# Patient Record
Sex: MALE
[De-identification: names, ages, dates, MRNs, and addresses within clinical notes are randomized per-mention and may not be internally consistent; named-entity substitution may affect disease eponyms.]

---

## 2019-10-29 ENCOUNTER — HOSPITAL ENCOUNTER (EMERGENCY)
Dept: HOSPITAL 5 - ED | Age: 49
LOS: 1 days | Discharge: HOME | End: 2019-10-30
Payer: COMMERCIAL

## 2019-10-29 DIAGNOSIS — R11.2: ICD-10-CM

## 2019-10-29 DIAGNOSIS — Z79.899: ICD-10-CM

## 2019-10-29 DIAGNOSIS — R00.1: ICD-10-CM

## 2019-10-29 DIAGNOSIS — N39.0: Primary | ICD-10-CM

## 2019-10-29 DIAGNOSIS — N20.0: ICD-10-CM

## 2019-10-29 PROCEDURE — 93010 ELECTROCARDIOGRAM REPORT: CPT

## 2019-10-29 PROCEDURE — 93005 ELECTROCARDIOGRAM TRACING: CPT

## 2019-10-29 PROCEDURE — 96375 TX/PRO/DX INJ NEW DRUG ADDON: CPT

## 2019-10-29 PROCEDURE — 36415 COLL VENOUS BLD VENIPUNCTURE: CPT

## 2019-10-29 PROCEDURE — 83690 ASSAY OF LIPASE: CPT

## 2019-10-29 PROCEDURE — 80307 DRUG TEST PRSMV CHEM ANLYZR: CPT

## 2019-10-29 PROCEDURE — 74176 CT ABD & PELVIS W/O CONTRAST: CPT

## 2019-10-29 PROCEDURE — 96365 THER/PROPH/DIAG IV INF INIT: CPT

## 2019-10-29 PROCEDURE — 81001 URINALYSIS AUTO W/SCOPE: CPT

## 2019-10-29 PROCEDURE — 96361 HYDRATE IV INFUSION ADD-ON: CPT

## 2019-10-29 PROCEDURE — 99284 EMERGENCY DEPT VISIT MOD MDM: CPT

## 2019-10-29 PROCEDURE — 82271 OCCULT BLOOD OTHER SOURCES: CPT

## 2019-10-29 PROCEDURE — 80053 COMPREHEN METABOLIC PANEL: CPT

## 2019-10-29 PROCEDURE — 87086 URINE CULTURE/COLONY COUNT: CPT

## 2019-10-29 PROCEDURE — 85025 COMPLETE CBC W/AUTO DIFF WBC: CPT

## 2019-10-30 VITALS — DIASTOLIC BLOOD PRESSURE: 65 MMHG | SYSTOLIC BLOOD PRESSURE: 119 MMHG

## 2019-10-30 LAB
ALBUMIN SERPL-MCNC: 5.1 G/DL (ref 3.9–5)
ALT SERPL-CCNC: 22 UNITS/L (ref 7–56)
BACTERIA #/AREA URNS HPF: (no result) /HPF
BASOPHILS # (AUTO): 0.1 K/MM3 (ref 0–0.1)
BASOPHILS NFR BLD AUTO: 1 % (ref 0–1.8)
BENZODIAZEPINES SCREEN,URINE: (no result)
BILIRUB UR QL STRIP: (no result)
BLOOD UR QL VISUAL: (no result)
BUN SERPL-MCNC: 21 MG/DL (ref 9–20)
BUN/CREAT SERPL: 16 %
CALCIUM SERPL-MCNC: 10.1 MG/DL (ref 8.4–10.2)
CAOX CRY #/AREA URNS HPF: (no result) /[HPF]
EOSINOPHIL # BLD AUTO: 0.1 K/MM3 (ref 0–0.4)
EOSINOPHIL NFR BLD AUTO: 1.7 % (ref 0–4.3)
HCT VFR BLD CALC: 49.1 % (ref 35.5–45.6)
HEMOLYSIS INDEX: 8
HGB BLD-MCNC: 16.3 GM/DL (ref 11.8–15.2)
LYMPHOCYTES # BLD AUTO: 1.5 K/MM3 (ref 1.2–5.4)
LYMPHOCYTES NFR BLD AUTO: 18.6 % (ref 13.4–35)
MCHC RBC AUTO-ENTMCNC: 33 % (ref 32–34)
MCV RBC AUTO: 90 FL (ref 84–94)
METHADONE SCREEN,URINE: (no result)
MONOCYTES # (AUTO): 0.5 K/MM3 (ref 0–0.8)
MONOCYTES % (AUTO): 6 % (ref 0–7.3)
MUCOUS THREADS #/AREA URNS HPF: (no result) /HPF
OPIATE SCREEN,URINE: (no result)
PH UR STRIP: 5 [PH] (ref 5–7)
PLATELET # BLD: 250 K/MM3 (ref 140–440)
RBC # BLD AUTO: 5.44 M/MM3 (ref 3.65–5.03)
RBC #/AREA URNS HPF: 160 /HPF (ref 0–6)
UROBILINOGEN UR-MCNC: < 2 MG/DL (ref ?–2)
WBC #/AREA URNS HPF: 30 /HPF (ref 0–6)

## 2019-10-30 NOTE — CAT SCAN REPORT
CT ABDOMEN AND PELVIS WITHOUT CONTRAST



INDICATION: rlq pain, n,v



CONTRAST: Without IV



COMPARISON: None available.



All CT scans at this location are performed using CT dose reduction for ALARA by means of automated e
xposure control. 



FINDINGS: Lung bases are clear. No pneumoperitoneum is seen. Liver is mildly enlarged and has a lengt
h of 19.3 cm. Several tiny probable cysts are seen in the liver. Spleen appears within normal limits.
 No other masses are seen. No evidence of bowel obstruction is noted. Appendix appears within normal.
 Gallbladder and bile ducts show no abnormalities. Pancreas shows no abnormalities. No free fluid is 
seen. No inflammatory changes are noted. No lymphadenopathy is seen.



No renal calculi are seen. The right renal collecting system is moderately prominent and mild edema i
s seen medially in the area of the renal pelvis and upper ureter. In the upper right ureter just belo
w the ureteropelvic junction a 3 mm obstructing calculus is noted.





IMPRESSION: Obstructing upper right ureteral calculus





Signer Name: Bry Moses MD 

Signed: 10/30/2019 3:13 AM

 Workstation Name: [id number] [email or web address] [id number]

## 2019-10-30 NOTE — EMERGENCY DEPARTMENT REPORT
ED Abdominal Pain HPI





- General


Chief Complaint: Abdominal Pain


Stated Complaint: RT SIDE ABD PAIN VOMITING


Time Seen by Provider: 10/30/19 00:40


Source: patient


Mode of arrival: Ambulatory


Limitations: No Limitations





- History of Present Illness


Initial Comments: 





49-year-old male with a past medical history of peptic ulcer disease, skin 

cancer treated with resection, varicocele surgery, right abdominal hernia repair

as a child presents to the hospital complaints complaints of sudden onset of 

right lower abdominal pain.  Pain was 10/10, sharp, associated nausea, vomiting,

and diaphoresis.  Pain is waxing and waning since 6 PM.  Last urine output was 

10 PM.  Patient denies hematuria, dysuria, or fever.  Complains of dark stools 

but not black stools.  He has not had any coffee-ground emesis or hematemesis.  

Denies previous history of kidney stones.


Severity scale (0 -10): 6





- Related Data


                                  Previous Rx's











 Medication  Instructions  Recorded  Last Taken  Type


 


HYDROcodone/APAP 5-325 [Emp[place] 1 each PO Q6HR PRN #25 tablet 10/30/19 Unknown Rx





5/325]    


 


Ondansetron [Zofran Odt] 4 mg PO Q8HR PRN #20 tab.rapdis 10/30/19 Unknown Rx


 


Tamsulosin [Flomax] 0.4 mg PO QDAY #14 cap 10/30/19 Unknown Rx


 


cephALEXin [Keflex] 500 mg PO Q6HR #28 capsule 10/30/19 Unknown Rx











                                    Allergies











Allergy/AdvReac Type Severity Reaction Status Date / Time


 


No Known Allergies Allergy   Verified 10/29/19 23:49














ED Review of Systems


ROS: 


Stated complaint: RT SIDE ABD PAIN VOMITING


Other details as noted in HPI





Comment: All other systems reviewed and negative





ED Past Medical Hx





- Past Medical History


Previous Medical History?: Yes


Hx of Cancer: Yes (skin removed)


Additional medical history: peptic ulcers





- Surgical History


Past Surgical History?: Yes


Additional Surgical History: skin ca.  Right inguinal hernia repair as a child. 

 Varicocele repair





- Social History


Smoking Status: Never Smoker


Substance Use Type: None





- Medications


Home Medications: 


                                Home Medications











 Medication  Instructions  Recorded  Confirmed  Last Taken  Type


 


HYDROcodone/APAP 5-325 [Emp[place] 1 each PO Q6HR PRN #25 tablet 10/30/19  Unknown Rx





5/325]     


 


Ondansetron [Zofran Odt] 4 mg PO Q8HR PRN #20 tab.rapdis 10/30/19  Unknown Rx


 


Tamsulosin [Flomax] 0.4 mg PO QDAY #14 cap 10/30/19  Unknown Rx


 


cephALEXin [Keflex] 500 mg PO Q6HR #28 capsule 10/30/19  Unknown Rx














ED Physical Exam





- General


Limitations: No Limitations





- Other


Other exam information: 





General: No acute distress


Head: Atraumatic


Eyes: normal appearance


ENT: Moist mucous membranes


Neck: Normal appearance, no midline tenderness


Chest: Clear to auscultation bilaterally


CV: Regular rate and rhythm


Abdomen: Soft, normal bowel sounds, mild right lower quadrant tenderness, 

nondistended, no rebound or guarding


rectal: guaic neg brown stool


Back: Normal inspection


Extremity: Normal inspection infection, full range of motion, no CVA tenderness


Neuro: Alert O x 3, no facial asymmetry, speech clear, no gross motor sensory 

deficit


Psych: Appropriate behavior


Skin: No rash





ED Course


                                   Vital Signs











  10/30/19 10/30/19 10/30/19





  00:38 01:00 02:00


 


Temperature 96.7 F L  


 


Pulse Rate 40 L 38 L 38 L


 


Respiratory 17 19 15





Rate   


 


Blood Pressure  129/74 121/64


 


Blood Pressure 139/76  





[Left]   


 


O2 Sat by Pulse 100 100 99





Oximetry   














  10/30/19





  02:31


 


Temperature 


 


Pulse Rate 37 L


 


Respiratory 15





Rate 


 


Blood Pressure 124/70


 


Blood Pressure 





[Left] 


 


O2 Sat by Pulse 99





Oximetry 














ED Medical Decision Making





- Lab Data


Result diagrams: 


                                 10/29/19 23:55





                                 10/29/19 23:55








                                   Lab Results











  10/29/19 10/29/19 10/30/19 Range/Units





  23:55 23:55 01:50 


 


WBC  8.2    (4.5-11.0)  K/mm3


 


RBC  5.44 H    (3.65-5.03)  M/mm3


 


Hgb  16.3 H    (11.8-15.2)  gm/dl


 


Hct  49.1 H    (35.5-45.6)  %


 


MCV  90    (84-94)  fl


 


MCH  30    (28-32)  pg


 


MCHC  33    (32-34)  %


 


RDW  13.5    (13.2-15.2)  %


 


Plt Count  250    (140-440)  K/mm3


 


Lymph % (Auto)  18.6    (13.4-35.0)  %


 


Mono % (Auto)  6.0    (0.0-7.3)  %


 


Eos % (Auto)  1.7    (0.0-4.3)  %


 


Baso % (Auto)  1.0    (0.0-1.8)  %


 


Lymph #  1.5    (1.2-5.4)  K/mm3


 


Mono #  0.5    (0.0-0.8)  K/mm3


 


Eos #  0.1    (0.0-0.4)  K/mm3


 


Baso #  0.1    (0.0-0.1)  K/mm3


 


Seg Neutrophils %  72.7 H    (40.0-70.0)  %


 


Seg Neutrophils #  5.9    (1.8-7.7)  K/mm3


 


Sodium   140   (137-145)  mmol/L


 


Potassium   3.7   (3.6-5.0)  mmol/L


 


Chloride   99.3   ()  mmol/L


 


Carbon Dioxide   25   (22-30)  mmol/L


 


Anion Gap   19   mmol/L


 


BUN   21 H   (9-20)  mg/dL


 


Creatinine   1.3   (0.8-1.5)  mg/dL


 


Estimated GFR   59   ml/min


 


BUN/Creatinine Ratio   16   %


 


Glucose   112 H   ()  mg/dL


 


Calcium   10.1   (8.4-10.2)  mg/dL


 


Total Bilirubin   0.50   (0.1-1.2)  mg/dL


 


AST   29   (5-40)  units/L


 


ALT   22   (7-56)  units/L


 


Alkaline Phosphatase   68   ()  units/L


 


Total Protein   7.9   (6.3-8.2)  g/dL


 


Albumin   5.1 H   (3.9-5)  g/dL


 


Albumin/Globulin Ratio   1.8   %


 


Lipase   20   (13-60)  units/L


 


Urine Color    [place]  (Yellow)  


 


Urine Turbidity    Cloudy  (Clear)  


 


Urine pH    5.0  (5.0-7.0)  


 


Ur Specific Gravity    1.023  (1.003-1.030)  


 


Urine Protein    100 mg/dl  (Negative)  mg/dL


 


Urine Glucose (UA)    Neg  (Negative)  mg/dL


 


Urine Ketones    Tr  (Negative)  mg/dL


 


Urine Blood    Lg  (Negative)  


 


Urine Nitrite    Neg  (Negative)  


 


Urine Bilirubin    Neg  (Negative)  


 


Urine Urobilinogen    < 2.0  (<2.0)  mg/dL


 


Ur Leukocyte Esterase    Neg  (Negative)  


 


Urine WBC (Auto)    30.0 H  (0.0-6.0)  /HPF


 


Urine RBC (Auto)    160.0  (0.0-6.0)  /HPF


 


U Epithel Cells (Auto)    1.0  (0-13.0)  /HPF


 


Urine Bacteria (Auto)    1+  (Negative)  /HPF


 


Calcium Oxalate Crystal    Few  


 


Urine Mucus    1+  /HPF


 


Urine Opiates Screen     


 


Urine Methadone Screen     


 


Ur Barbiturates Screen     


 


Ur Phencyclidine Scrn     


 


Ur Amphetamines Screen     


 


U Benzodiazepines Scrn     


 


Urine Cocaine Screen     


 


U Marijuana (THC) Screen     


 


Drugs of Abuse Note     














  10/30/19 Range/Units





  01:50 


 


WBC   (4.5-11.0)  K/mm3


 


RBC   (3.65-5.03)  M/mm3


 


Hgb   (11.8-15.2)  gm/dl


 


Hct   (35.5-45.6)  %


 


MCV   (84-94)  fl


 


MCH   (28-32)  pg


 


MCHC   (32-34)  %


 


RDW   (13.2-15.2)  %


 


Plt Count   (140-440)  K/mm3


 


Lymph % (Auto)   (13.4-35.0)  %


 


Mono % (Auto)   (0.0-7.3)  %


 


Eos % (Auto)   (0.0-4.3)  %


 


Baso % (Auto)   (0.0-1.8)  %


 


Lymph #   (1.2-5.4)  K/mm3


 


Mono #   (0.0-0.8)  K/mm3


 


Eos #   (0.0-0.4)  K/mm3


 


Baso #   (0.0-0.1)  K/mm3


 


Seg Neutrophils %   (40.0-70.0)  %


 


Seg Neutrophils #   (1.8-7.7)  K/mm3


 


Sodium   (137-145)  mmol/L


 


Potassium   (3.6-5.0)  mmol/L


 


Chloride   ()  mmol/L


 


Carbon Dioxide   (22-30)  mmol/L


 


Anion Gap   mmol/L


 


BUN   (9-20)  mg/dL


 


Creatinine   (0.8-1.5)  mg/dL


 


Estimated GFR   ml/min


 


BUN/Creatinine Ratio   %


 


Glucose   ()  mg/dL


 


Calcium   (8.4-10.2)  mg/dL


 


Total Bilirubin   (0.1-1.2)  mg/dL


 


AST   (5-40)  units/L


 


ALT   (7-56)  units/L


 


Alkaline Phosphatase   ()  units/L


 


Total Protein   (6.3-8.2)  g/dL


 


Albumin   (3.9-5)  g/dL


 


Albumin/Globulin Ratio   %


 


Lipase   (13-60)  units/L


 


Urine Color   (Yellow)  


 


Urine Turbidity   (Clear)  


 


Urine pH   (5.0-7.0)  


 


Ur Specific Gravity   (1.003-1.030)  


 


Urine Protein   (Negative)  mg/dL


 


Urine Glucose (UA)   (Negative)  mg/dL


 


Urine Ketones   (Negative)  mg/dL


 


Urine Blood   (Negative)  


 


Urine Nitrite   (Negative)  


 


Urine Bilirubin   (Negative)  


 


Urine Urobilinogen   (<2.0)  mg/dL


 


Ur Leukocyte Esterase   (Negative)  


 


Urine WBC (Auto)   (0.0-6.0)  /HPF


 


Urine RBC (Auto)   (0.0-6.0)  /HPF


 


U Epithel Cells (Auto)   (0-13.0)  /HPF


 


Urine Bacteria (Auto)   (Negative)  /HPF


 


Calcium Oxalate Crystal   


 


Urine Mucus   /HPF


 


Urine Opiates Screen  Presumptive negative  


 


Urine Methadone Screen  Presumptive negative  


 


Ur Barbiturates Screen  Presumptive negative  


 


Ur Phencyclidine Scrn  Presumptive negative  


 


Ur Amphetamines Screen  Presumptive negative  


 


U Benzodiazepines Scrn  Presumptive negative  


 


Urine Cocaine Screen  Presumptive negative  


 


U Marijuana (THC) Screen  Presumptive negative  


 


Drugs of Abuse Note  Disclamer  














- EKG Data


-: EKG Interpreted by Me


EKG shows normal: sinus rhythm, ST-T waves (no stemi)


Rate: bradycardia (39)





- Radiology Data


Radiology results: report reviewed (CT abdomen and pelvis: Obstructed upper 

right ureteral calculus 3 mm.)





- Medical Decision Making





Patient noted to be bradycardic.  He denies lightheadedness or near syncope or 

medication use.  EKG shows sinus bradycardia at 39 rate fluctuates between the 

30s to 40s with normal blood pressure.





Patient admitted to have this renal stone at the [facility] 3 mm.  I informed patient's

 and his wife that the stone size is less than 5 mm so will likely pass 

spontaneously.  Patient is however going to experience additional pain until the

 stone passes.  Patient's urine is positive for infection and he received IV 

Rocephin.  No signs of leukocytosis, fever, or sepsis at this point.  Patient 

lives in [place] and is scheduled to fly out at 6 AM to [facility].  I cautioned him 

against traveling to another country because he is at risk for recurrent pain, 

vomiting, or worsening infection.  If these occur patient would need to return 

to the hospital.  Patient will also need follow-up with urology.  She provided 

follow-up for urology here but ultimately will likely be seen in his home state.

  Patient will be provided a copy of labs, CT report, and CT or disc to take to 

his follow-up physicians also to have with him as if he decides to travel.  In 

the meantime patient will be prescribed narcotic pain medication, nausea 

medication, and antibiotics. He Cannot take NSAIDs due to history of peptic 

ulcer disease.  Patient and his wife may decide not to go on the trip.  If this 

is the case they are encouraged to call their local urologist today/tomorrow to 

schedule an appointment within a week.





- Differential Diagnosis


renal colic, appendicitis, UTI


Critical Care Time: No


Critical care attestation.: 


If time is entered above; I have spent that time in minutes in the direct care 

of this critically ill patient, excluding procedure time.








ED Disposition


Clinical Impression: 


 Renal colic on right side, UTI (urinary tract infection), Sinus bradycardia





Disposition: DC-01 TO HOME OR SELFCARE


Is pt being admited?: No


Does the pt Need Aspirin: No


Condition: Stable


Instructions:  Urinary Tract Infection in Men (ED), Renal Colic (ED), 

Bradycardia (ED)


Additional Instructions: 


Take the medication as prescribed.  Follow-up with a urologist.  Return if 

symptoms worsen as indicated by your discharge instructions.  Take a copy of the

 results provided to your doctor for follow-up.


Prescriptions: 


Tamsulosin [Flomax] 0.4 mg PO QDAY #14 cap


cephALEXin [Keflex] 500 mg PO Q6HR #28 capsule


HYDROcodone/APAP 5-325 [Empire 5/325] 1 each PO Q6HR PRN #25 tablet


 PRN Reason: Pain


Ondansetron [Zofran Odt] 4 mg PO Q8HR PRN #20 tab.rapdis


 PRN Reason: Nausea And Vomiting


Referrals: 


CRYS KENNEDY MD [Staff Physician] - 3-5 Days


(Urology


)


your, primary care doctor [Other] - 7-10 days


Time of Disposition: 03:45